# Patient Record
Sex: FEMALE | Race: BLACK OR AFRICAN AMERICAN | NOT HISPANIC OR LATINO | URBAN - METROPOLITAN AREA
[De-identification: names, ages, dates, MRNs, and addresses within clinical notes are randomized per-mention and may not be internally consistent; named-entity substitution may affect disease eponyms.]

---

## 2019-08-13 ENCOUNTER — EMERGENCY (EMERGENCY)
Facility: HOSPITAL | Age: 54
LOS: 1 days | Discharge: HOME | End: 2019-08-13
Attending: STUDENT IN AN ORGANIZED HEALTH CARE EDUCATION/TRAINING PROGRAM
Payer: COMMERCIAL

## 2019-08-13 VITALS
SYSTOLIC BLOOD PRESSURE: 122 MMHG | DIASTOLIC BLOOD PRESSURE: 83 MMHG | TEMPERATURE: 98 F | OXYGEN SATURATION: 99 % | WEIGHT: 164.02 LBS | HEIGHT: 65 IN | HEART RATE: 67 BPM | RESPIRATION RATE: 18 BRPM

## 2019-08-13 PROCEDURE — 70450 CT HEAD/BRAIN W/O DYE: CPT

## 2019-08-13 PROCEDURE — 99284 EMERGENCY DEPT VISIT MOD MDM: CPT

## 2019-08-13 PROCEDURE — 70450 CT HEAD/BRAIN W/O DYE: CPT | Mod: 26

## 2019-08-13 NOTE — ED PROVIDER NOTE - ATTENDING CONTRIBUTION TO CARE
54 YOF pmhx HTN here for sharp shooting pain in right forehead, lasting few seconds at a time, occurring about 6 times today. Denies pain at this time. PCP recently switched anti-HTN to norvasc  yesterday. Denies fevers, chill, blurry or double vision, neck pain or stiffness, chest pain, sob, abd pain, n/v/d, urinary symptoms. Has not needed to take anything for pain thus far    Vital Signs Stable  Gen: well appearing, NAD  HEENT: MMM, neck supple, no temporal pain  Cardiac: regular rate rhythm, normal S1S2  Chest: CTA BL, no wheezes or crackles  Abdomen: normal BS, soft, non tender non distended  Extremity: no gross deformity, good perfusion  Skin: no rash  Neuro: grossly normal    AP - possible trigeminal neuralgia. no pain at this time. patient requesting CT head for reassurance. if wnl anticipate f/u with pmd and neurology

## 2019-08-13 NOTE — ED PROVIDER NOTE - OBJECTIVE STATEMENT
54 year old female with pmhx HTN presents to ED c/o intermittent sharp sensation to her right side head/ forehead today. Patient reports sensation of sharp shooting pain which extends from the front of her head to the middle of her forehead. Episodes last a few seconds when they occur and have happened approx 6 times today last episode 1.5 hour PTA. She has never had similar sensation in the past. Yesterday saw PCP and was switched to Norvasc which she started today. Denies fevers, chill, blurry or double vision, neck pain or stiffness, chest pain, sob, abd pain, n/v/d, urinary symptoms. Has not needed to take anything for pain thus far

## 2019-08-13 NOTE — ED ADULT NURSE NOTE - NSIMPLEMENTINTERV_GEN_ALL_ED
Implemented All Universal Safety Interventions:  [place] to call system. Call bell, personal items and telephone within reach. Instruct patient to call for assistance. Room bathroom lighting operational. Non-slip footwear when patient is off stretcher. Physically safe environment: no spills, clutter or unnecessary equipment. Stretcher in lowest position, wheels locked, appropriate side rails in place.

## 2019-08-13 NOTE — ED ADULT NURSE NOTE - OBJECTIVE STATEMENT
Ambulatory, well-appearing pt c/o intermittent sharp pains in her head.  Approximately 5-6 episodes today, pain feels "shooting" when it comes, lasts seconds and goes away.  No associated blurry vision, dizziness, numbness/tingling.  No head trauma, no hx of headaches.  Pt is conversive without difficulty, clear speech, denies cp, sob, abd pain, n/v, fevers, falls.

## 2019-08-13 NOTE — ED PROVIDER NOTE - NSFOLLOWUPINSTRUCTIONS_ED_ALL_ED_FT
1. Follow up with your primary care doctor in the next 1-2 days   2. For continued pain recommend follow up with neurology for further evaluation call [phone] to make an appointment   3. For persistent pain take Motrin 600mg every 6 hours alternated with Tylenol 1g every + hours  4. Return to ED for change of symptoms including increased or persistent pain, numbness, weakness, visual changes, fevers and all other concerns

## 2019-08-13 NOTE — ED PROVIDER NOTE - PHYSICAL EXAMINATION
CONSTITUTIONAL: Patient is awake, alert and oriented x 3. Patient is well appearing and in no acute distress.  HEAD: NCAT, No ttp forehead or right temple   EYES: PERRL b/l, EOMI,   ENT:Airway patent, Nasal mucosa clear. Mouth with normal mucosa. Throat has no vesicles, no oropharyngeal exudates and uvula is midline.  NECK: supple, No LAD,  LUNGS: CTA B/L,  HEART: RRR.+S1S2 no murmurs,   ABDOMEN: Soft nd/nt+bs no rebound or guarding.   EXTREMITY: no edema or calf tenderness b/l, FROM upper and lower ext b/l  NEURO: Cn3-12 grossly intact. Strength5/5UE/LE.NmlSensation.Gait normal.